# Patient Record
Sex: FEMALE | Race: WHITE | NOT HISPANIC OR LATINO | Employment: FULL TIME | URBAN - METROPOLITAN AREA
[De-identification: names, ages, dates, MRNs, and addresses within clinical notes are randomized per-mention and may not be internally consistent; named-entity substitution may affect disease eponyms.]

---

## 2020-04-21 DIAGNOSIS — Z01.84 ANTIBODY RESPONSE EXAMINATION: ICD-10-CM

## 2020-08-20 ENCOUNTER — TELEPHONE (OUTPATIENT)
Dept: EMERGENCY MEDICINE | Facility: HOSPITAL | Age: 50
End: 2020-08-20

## 2020-08-20 DIAGNOSIS — U07.1 COVID-19 VIRUS DETECTED: ICD-10-CM

## 2020-08-20 NOTE — TELEPHONE ENCOUNTER
Patient has been notified of COVID test result on behalf of [employer] and provided clinical guidance on such.

## 2021-01-16 ENCOUNTER — IMMUNIZATION (OUTPATIENT)
Dept: PRIMARY CARE CLINIC | Facility: CLINIC | Age: 51
End: 2021-01-16
Payer: COMMERCIAL

## 2021-01-16 DIAGNOSIS — Z23 NEED FOR VACCINATION: Primary | ICD-10-CM

## 2021-01-16 PROCEDURE — 91300 COVID-19, MRNA, LNP-S, PF, 30 MCG/0.3 ML DOSE VACCINE: CPT | Mod: PBBFAC | Performed by: EMERGENCY MEDICINE

## 2021-02-06 ENCOUNTER — IMMUNIZATION (OUTPATIENT)
Dept: PRIMARY CARE CLINIC | Facility: CLINIC | Age: 51
End: 2021-02-06
Payer: COMMERCIAL

## 2021-02-06 DIAGNOSIS — Z23 NEED FOR VACCINATION: Primary | ICD-10-CM

## 2021-02-06 PROCEDURE — 0002A COVID-19, MRNA, LNP-S, PF, 30 MCG/0.3 ML DOSE VACCINE: CPT | Mod: PBBFAC | Performed by: FAMILY MEDICINE

## 2021-02-06 PROCEDURE — 91300 COVID-19, MRNA, LNP-S, PF, 30 MCG/0.3 ML DOSE VACCINE: CPT | Mod: PBBFAC | Performed by: FAMILY MEDICINE

## 2021-11-10 ENCOUNTER — TELEPHONE (OUTPATIENT)
Dept: ADMINISTRATIVE | Facility: OTHER | Age: 51
End: 2021-11-10
Payer: COMMERCIAL

## 2022-09-14 ENCOUNTER — OFFICE VISIT (OUTPATIENT)
Dept: PRIMARY CARE CLINIC | Facility: CLINIC | Age: 52
End: 2022-09-14
Payer: COMMERCIAL

## 2022-09-14 VITALS
WEIGHT: 141.63 LBS | RESPIRATION RATE: 16 BRPM | OXYGEN SATURATION: 99 % | DIASTOLIC BLOOD PRESSURE: 80 MMHG | SYSTOLIC BLOOD PRESSURE: 132 MMHG | BODY MASS INDEX: 26.74 KG/M2 | TEMPERATURE: 97 F | HEART RATE: 113 BPM | HEIGHT: 61 IN

## 2022-09-14 DIAGNOSIS — R00.2 PALPITATIONS: ICD-10-CM

## 2022-09-14 DIAGNOSIS — R73.01 ELEVATED FASTING BLOOD SUGAR: ICD-10-CM

## 2022-09-14 DIAGNOSIS — I49.3 PVC'S (PREMATURE VENTRICULAR CONTRACTIONS): ICD-10-CM

## 2022-09-14 DIAGNOSIS — Z76.89 ENCOUNTER TO ESTABLISH CARE: Primary | ICD-10-CM

## 2022-09-14 LAB
BILIRUB SERPL-MCNC: NORMAL MG/DL
BLOOD URINE, POC: NORMAL
CLARITY, POC UA: CLEAR
COLOR, POC UA: YELLOW
GLUCOSE UR QL STRIP: NORMAL
KETONES UR QL STRIP: NORMAL
LEUKOCYTE ESTERASE URINE, POC: NORMAL
NITRITE, POC UA: NORMAL
PH, POC UA: 7
PROTEIN, POC: NORMAL
SPECIFIC GRAVITY, POC UA: 1.01
UROBILINOGEN, POC UA: NORMAL

## 2022-09-14 PROCEDURE — 99999 PR PBB SHADOW E&M-EST. PATIENT-LVL III: ICD-10-PCS | Mod: PBBFAC,,, | Performed by: NURSE PRACTITIONER

## 2022-09-14 PROCEDURE — 1159F MED LIST DOCD IN RCRD: CPT | Mod: CPTII,S$GLB,, | Performed by: NURSE PRACTITIONER

## 2022-09-14 PROCEDURE — 81002 POCT URINE DIPSTICK WITHOUT MICROSCOPE: ICD-10-PCS | Mod: S$GLB,,, | Performed by: NURSE PRACTITIONER

## 2022-09-14 PROCEDURE — 3075F PR MOST RECENT SYSTOLIC BLOOD PRESS GE 130-139MM HG: ICD-10-PCS | Mod: CPTII,S$GLB,, | Performed by: NURSE PRACTITIONER

## 2022-09-14 PROCEDURE — 3079F PR MOST RECENT DIASTOLIC BLOOD PRESSURE 80-89 MM HG: ICD-10-PCS | Mod: CPTII,S$GLB,, | Performed by: NURSE PRACTITIONER

## 2022-09-14 PROCEDURE — 3008F PR BODY MASS INDEX (BMI) DOCUMENTED: ICD-10-PCS | Mod: CPTII,S$GLB,, | Performed by: NURSE PRACTITIONER

## 2022-09-14 PROCEDURE — 3008F BODY MASS INDEX DOCD: CPT | Mod: CPTII,S$GLB,, | Performed by: NURSE PRACTITIONER

## 2022-09-14 PROCEDURE — 81002 URINALYSIS NONAUTO W/O SCOPE: CPT | Mod: S$GLB,,, | Performed by: NURSE PRACTITIONER

## 2022-09-14 PROCEDURE — 99203 OFFICE O/P NEW LOW 30 MIN: CPT | Mod: S$GLB,,, | Performed by: NURSE PRACTITIONER

## 2022-09-14 PROCEDURE — 1159F PR MEDICATION LIST DOCUMENTED IN MEDICAL RECORD: ICD-10-PCS | Mod: CPTII,S$GLB,, | Performed by: NURSE PRACTITIONER

## 2022-09-14 PROCEDURE — 3075F SYST BP GE 130 - 139MM HG: CPT | Mod: CPTII,S$GLB,, | Performed by: NURSE PRACTITIONER

## 2022-09-14 PROCEDURE — 3079F DIAST BP 80-89 MM HG: CPT | Mod: CPTII,S$GLB,, | Performed by: NURSE PRACTITIONER

## 2022-09-14 PROCEDURE — 99203 PR OFFICE/OUTPT VISIT, NEW, LEVL III, 30-44 MIN: ICD-10-PCS | Mod: S$GLB,,, | Performed by: NURSE PRACTITIONER

## 2022-09-14 PROCEDURE — 99999 PR PBB SHADOW E&M-EST. PATIENT-LVL III: CPT | Mod: PBBFAC,,, | Performed by: NURSE PRACTITIONER

## 2022-09-14 NOTE — PROGRESS NOTES
"Chief Complaint  Chief Complaint   Patient presents with    heart flutters     X2wks         HPI    Present with intermittent palpitations "flutter" for two weeks, notices more during the day while sitting. States does not notice them at night or with activity. Has stopped caffeine 1 week, but did not improve symptoms. Occasional flushing. Denies shortness of breath, chest, activity intolerance. Initially has some off-balance/dizzy feeling for 2 days, but that has stopped. Denies light-headedness, headaches.    Previous episode in 2019 with palpitations for 1 week, had holter, echo, and EKG- PVCs, treated with 2 weeks of Metoprolol. Magnesium slightly low at that time 1.7, was instructed to magnesium oxide, but did not take. Symptoms resolved.    Family Hx: Mother HTN, Father HTN. Denies major heart disease/heart attacks.    States because of family history of diabetes, randomly check blood sugar in July with family member's blood sugar monitor. Fasting morning sugar was 107-152. Started to decrease sugar intake. Checked morning blood sugars for 2 weeks and stopped. States check blood sugar 3 weeks ago, fasting morning sugar was 102. Denies polyuria, polyphagia.        PAST MEDICAL HISTORY:  No past medical history on file.    PAST SURGICAL HISTORY:  No past surgical history on file.    SOCIAL HISTORY:  Social History     Socioeconomic History    Marital status:    Tobacco Use    Smoking status: Never   Substance and Sexual Activity    Alcohol use: Not Currently    Drug use: Never       FAMILY HISTORY:  No family history on file.    ALLERGIES AND MEDICATIONS: updated and reviewed.  Review of patient's allergies indicates:  No Known Allergies  Current Outpatient Medications   Medication Sig Dispense Refill    multivit with minerals/lutein (MULTIVITAMIN 50 PLUS ORAL) Take by mouth.       No current facility-administered medications for this visit.         ROS  Review of Systems   Constitutional: Negative.  " "  HENT: Negative.     Eyes: Negative.    Respiratory: Negative.     Cardiovascular:  Positive for palpitations (x2 weeks, intermittent). Negative for chest pain and leg swelling.   Gastrointestinal: Negative.         Regular daily BM, brown, solid   Endocrine: Negative.    Genitourinary: Negative.    Musculoskeletal: Negative.    Skin: Negative.    Neurological:  Positive for dizziness (occasional off-balance. Dx: Vertigo). Negative for light-headedness and headaches.   Psychiatric/Behavioral: Negative.           PHYSICAL EXAM  Vitals:    09/14/22 1343   BP: 132/80   BP Location: Left arm   Patient Position: Sitting   BP Method: Medium (Manual)   Pulse: (!) 113   Resp: 16   Temp: 97.3 °F (36.3 °C)   TempSrc: Temporal   SpO2: 99%   Weight: 64.3 kg (141 lb 10.3 oz)   Height: 5' 1" (1.549 m)    Body mass index is 26.76 kg/m².  Weight: 64.3 kg (141 lb 10.3 oz)   Height: 5' 1" (154.9 cm)     Physical Exam  Vitals reviewed.   Constitutional:       Appearance: Normal appearance. She is normal weight.   HENT:      Head: Normocephalic.      Right Ear: Tympanic membrane, ear canal and external ear normal.      Left Ear: Tympanic membrane, ear canal and external ear normal.      Nose: Nose normal.      Mouth/Throat:      Mouth: Mucous membranes are moist.      Pharynx: Oropharynx is clear.   Eyes:      Conjunctiva/sclera: Conjunctivae normal.   Cardiovascular:      Rate and Rhythm: Regular rhythm. Tachycardia present.      Pulses: Normal pulses.      Heart sounds: Normal heart sounds.   Pulmonary:      Effort: Pulmonary effort is normal.      Breath sounds: Normal breath sounds.   Abdominal:      General: Bowel sounds are normal.      Palpations: Abdomen is soft.   Musculoskeletal:         General: Normal range of motion.      Cervical back: Normal range of motion.   Skin:     General: Skin is warm and dry.   Neurological:      Mental Status: She is alert and oriented to person, place, and time.   Psychiatric:         Mood and " Affect: Mood normal.         Behavior: Behavior normal.       Health Maintenance         Date Due Completion Date    Hepatitis C Screening Never done ---    Cervical Cancer Screening Never done ---    Lipid Panel Never done ---    HIV Screening Never done ---    Mammogram Never done ---    Colorectal Cancer Screening Never done ---    COVID-19 Vaccine (3 - Booster for Pfizer series) 07/06/2021 2/6/2021    Shingles Vaccine (1 of 2) Never done ---    Influenza Vaccine (1) 09/01/2022 10/27/2021    TETANUS VACCINE 04/02/2023 4/2/2013              Assessment & Plan    Problem List Items Addressed This Visit    None  Visit Diagnoses       Encounter to establish care    -  Primary    Relevant Orders    CBC Auto Differential (Completed)    Comprehensive Metabolic Panel (Completed)    Lipid Panel (Completed)    TSH (Completed)    Hemoglobin A1C    POCT URINE DIPSTICK WITHOUT MICROSCOPE (Completed)    Magnesium (Completed)    Palpitations        Relevant Orders    Holter monitor - 24 hour    Elevated fasting blood sugar        PVC's (premature ventricular contractions)              Problem List Items Addressed This Visit    None  Visit Diagnoses       Encounter to establish care    -  Primary    Relevant Orders    CBC Auto Differential    Comprehensive Metabolic Panel    Lipid Panel    TSH    Hemoglobin A1C    POCT URINE DIPSTICK WITHOUT MICROSCOPE (Completed)    Magnesium  Obtain labs for annual wellness, evaluate elevated blood sugar, and evaluate for any abnormalities that may be related to palpitations.  Urine dipstick in office negative, no glucose.    Will discuss remainder of wellness prevention at scheduled appt with Dr. Ospina in October. Stated that she will also be establishing with a OB/GYN for mammo and PAP testing.     Palpitations        Relevant Orders    Holter monitor - 24 hour  Will evaluate palpitations. If palpitations continue, will consider trial of metoprolol and cardiology referral.    Elevated  fasting blood sugar      Will evaluate elevated home fasting blood sugars with labs. Will evaluate for treatment based on lab results. Discussed continuing healthy eating choices lower in carbohydrates and sugar.      PVC's (premature ventricular contractions)      History in 2019. Will evaluate for PVCs on 24hr holter monitor.             Follow-up: Follow up in about 2 weeks (around 9/28/2022) for follow up with Dr. Ospina.    Citlaly Acharya    Medication List with Changes/Refills   Current Medications    MULTIVIT WITH MINERALS/LUTEIN (MULTIVITAMIN 50 PLUS ORAL)    Take by mouth.   Discontinued Medications    CETIRIZINE (ZYRTEC) 10 MG TABLET    Take 1 tablet (10 mg total) by mouth once daily.    ETODOLAC (LODINE) 200 MG CAP    Take 1 capsule (200 mg total) by mouth 3 (three) times daily.    FLUTICASONE PROPIONATE (FLONASE) 50 MCG/ACTUATION NASAL SPRAY    1 spray (50 mcg total) by Each Nostril route 2 (two) times daily as needed.    MELOXICAM (MOBIC) 15 MG TABLET    Take 1 tablet (15 mg total) by mouth once daily. Take with Pepcid    ONDANSETRON (ZOFRAN-ODT) 4 MG TBDL    Take 1 tablet (4 mg total) by mouth every 8 (eight) hours as needed.

## 2022-09-19 ENCOUNTER — PATIENT MESSAGE (OUTPATIENT)
Dept: PRIMARY CARE CLINIC | Facility: CLINIC | Age: 52
End: 2022-09-19
Payer: COMMERCIAL

## 2022-09-20 ENCOUNTER — PATIENT MESSAGE (OUTPATIENT)
Dept: PRIMARY CARE CLINIC | Facility: CLINIC | Age: 52
End: 2022-09-20
Payer: COMMERCIAL

## 2022-09-27 ENCOUNTER — PATIENT MESSAGE (OUTPATIENT)
Dept: PRIMARY CARE CLINIC | Facility: CLINIC | Age: 52
End: 2022-09-27
Payer: COMMERCIAL

## 2022-10-10 ENCOUNTER — PATIENT MESSAGE (OUTPATIENT)
Dept: PRIMARY CARE CLINIC | Facility: CLINIC | Age: 52
End: 2022-10-10

## 2022-10-10 ENCOUNTER — OFFICE VISIT (OUTPATIENT)
Dept: PRIMARY CARE CLINIC | Facility: CLINIC | Age: 52
End: 2022-10-10
Payer: COMMERCIAL

## 2022-10-10 ENCOUNTER — PATIENT MESSAGE (OUTPATIENT)
Dept: ADMINISTRATIVE | Facility: HOSPITAL | Age: 52
End: 2022-10-10
Payer: COMMERCIAL

## 2022-10-10 VITALS
DIASTOLIC BLOOD PRESSURE: 70 MMHG | BODY MASS INDEX: 26.54 KG/M2 | RESPIRATION RATE: 18 BRPM | HEIGHT: 61 IN | OXYGEN SATURATION: 97 % | SYSTOLIC BLOOD PRESSURE: 118 MMHG | WEIGHT: 140.56 LBS | TEMPERATURE: 98 F | HEART RATE: 95 BPM

## 2022-10-10 DIAGNOSIS — Z11.4 SCREENING FOR HIV (HUMAN IMMUNODEFICIENCY VIRUS): ICD-10-CM

## 2022-10-10 DIAGNOSIS — Z11.59 NEED FOR HEPATITIS C SCREENING TEST: ICD-10-CM

## 2022-10-10 DIAGNOSIS — Z12.31 ENCOUNTER FOR SCREENING MAMMOGRAM FOR BREAST CANCER: ICD-10-CM

## 2022-10-10 DIAGNOSIS — Z12.11 COLON CANCER SCREENING: ICD-10-CM

## 2022-10-10 DIAGNOSIS — Z23 NEED FOR VACCINATION: ICD-10-CM

## 2022-10-10 DIAGNOSIS — E78.2 MIXED HYPERLIPIDEMIA: ICD-10-CM

## 2022-10-10 DIAGNOSIS — I49.3 FREQUENT PVCS: ICD-10-CM

## 2022-10-10 DIAGNOSIS — Z00.00 ANNUAL PHYSICAL EXAM: Primary | ICD-10-CM

## 2022-10-10 PROCEDURE — 3078F PR MOST RECENT DIASTOLIC BLOOD PRESSURE < 80 MM HG: ICD-10-PCS | Mod: CPTII,S$GLB,, | Performed by: FAMILY MEDICINE

## 2022-10-10 PROCEDURE — 1159F PR MEDICATION LIST DOCUMENTED IN MEDICAL RECORD: ICD-10-PCS | Mod: CPTII,S$GLB,, | Performed by: FAMILY MEDICINE

## 2022-10-10 PROCEDURE — 99999 PR PBB SHADOW E&M-EST. PATIENT-LVL IV: CPT | Mod: PBBFAC,,, | Performed by: FAMILY MEDICINE

## 2022-10-10 PROCEDURE — 1160F PR REVIEW ALL MEDS BY PRESCRIBER/CLIN PHARMACIST DOCUMENTED: ICD-10-PCS | Mod: CPTII,S$GLB,, | Performed by: FAMILY MEDICINE

## 2022-10-10 PROCEDURE — 1159F MED LIST DOCD IN RCRD: CPT | Mod: CPTII,S$GLB,, | Performed by: FAMILY MEDICINE

## 2022-10-10 PROCEDURE — 3074F SYST BP LT 130 MM HG: CPT | Mod: CPTII,S$GLB,, | Performed by: FAMILY MEDICINE

## 2022-10-10 PROCEDURE — 99396 PREV VISIT EST AGE 40-64: CPT | Mod: S$GLB,,, | Performed by: FAMILY MEDICINE

## 2022-10-10 PROCEDURE — 3044F HG A1C LEVEL LT 7.0%: CPT | Mod: CPTII,S$GLB,, | Performed by: FAMILY MEDICINE

## 2022-10-10 PROCEDURE — 3008F BODY MASS INDEX DOCD: CPT | Mod: CPTII,S$GLB,, | Performed by: FAMILY MEDICINE

## 2022-10-10 PROCEDURE — 3044F PR MOST RECENT HEMOGLOBIN A1C LEVEL <7.0%: ICD-10-PCS | Mod: CPTII,S$GLB,, | Performed by: FAMILY MEDICINE

## 2022-10-10 PROCEDURE — 99396 PR PREVENTIVE VISIT,EST,40-64: ICD-10-PCS | Mod: S$GLB,,, | Performed by: FAMILY MEDICINE

## 2022-10-10 PROCEDURE — 1160F RVW MEDS BY RX/DR IN RCRD: CPT | Mod: CPTII,S$GLB,, | Performed by: FAMILY MEDICINE

## 2022-10-10 PROCEDURE — 3078F DIAST BP <80 MM HG: CPT | Mod: CPTII,S$GLB,, | Performed by: FAMILY MEDICINE

## 2022-10-10 PROCEDURE — 99999 PR PBB SHADOW E&M-EST. PATIENT-LVL IV: ICD-10-PCS | Mod: PBBFAC,,, | Performed by: FAMILY MEDICINE

## 2022-10-10 PROCEDURE — 3074F PR MOST RECENT SYSTOLIC BLOOD PRESSURE < 130 MM HG: ICD-10-PCS | Mod: CPTII,S$GLB,, | Performed by: FAMILY MEDICINE

## 2022-10-10 PROCEDURE — 3008F PR BODY MASS INDEX (BMI) DOCUMENTED: ICD-10-PCS | Mod: CPTII,S$GLB,, | Performed by: FAMILY MEDICINE

## 2022-10-10 RX ORDER — LANOLIN ALCOHOL/MO/W.PET/CERES
400 CREAM (GRAM) TOPICAL NIGHTLY
Refills: 0 | COMMUNITY
Start: 2022-10-10

## 2022-10-10 RX ORDER — ZOSTER VACCINE RECOMBINANT, ADJUVANTED 50 MCG/0.5
0.5 KIT INTRAMUSCULAR ONCE
Qty: 1 EACH | Refills: 1 | Status: SHIPPED | OUTPATIENT
Start: 2022-10-10 | End: 2022-10-10

## 2022-10-10 RX ORDER — AMOXICILLIN 500 MG
1 CAPSULE ORAL 2 TIMES DAILY
COMMUNITY

## 2022-10-10 NOTE — PROGRESS NOTES
Pt states last mammogram done in 2020 and is having her current OBGYN order her overdue mammogram. Pt request to send in her records from the previous mammogram.

## 2022-10-10 NOTE — PROGRESS NOTES
"Subjective:       Patient ID: Lupe Gasca is a 52 y.o. female.    Chief Complaint: Follow-up (Pt states in for a follow-up)    Here for annual, results from recent workup.  Holter monitor showed frequent PVCs.  Patient still having frequent palpitations.  No dizziness, lightheadedness, chest pain or shortness of breath.  Seems to be very sensitive to caffeine intake, a little better when she cut down on caffeine.  Cholesterol and triglycerides recent screening since started to cut sugar out of her diet.  Walking for exercise.  No family history of heart disease.  Echo done several years ago was normal.  Took beta-blockers for short period of time, palpitations    Review of Systems   Constitutional:  Negative for chills, fatigue and fever.   HENT:  Negative for congestion.    Eyes:  Negative for visual disturbance.   Respiratory:  Negative for cough, chest tightness and shortness of breath.    Cardiovascular:  Positive for palpitations. Negative for chest pain and leg swelling.   Gastrointestinal:  Negative for abdominal pain, nausea and vomiting.   Genitourinary:  Negative for difficulty urinating.   Musculoskeletal:  Negative for arthralgias.   Skin:  Negative for rash.   Allergic/Immunologic: Negative for immunocompromised state.   Neurological:  Negative for dizziness.   Psychiatric/Behavioral:  Negative for sleep disturbance.      Objective:      Vitals:    10/10/22 1046   BP: 118/70   BP Location: Right arm   Patient Position: Sitting   BP Method: Medium (Manual)   Pulse: 95   Resp: 18   Temp: 98.2 °F (36.8 °C)   TempSrc: Temporal   SpO2: 97%   Weight: 63.7 kg (140 lb 8.7 oz)   Height: 5' 1" (1.549 m)     Physical Exam  Vitals and nursing note reviewed.   Constitutional:       General: She is not in acute distress.     Appearance: Normal appearance. She is well-developed.   HENT:      Head: Normocephalic and atraumatic.   Neck:      Vascular: No carotid bruit.   Cardiovascular:      Rate and Rhythm: " Normal rate and regular rhythm.      Heart sounds: Normal heart sounds.   Pulmonary:      Effort: Pulmonary effort is normal.      Breath sounds: Normal breath sounds.   Abdominal:      General: There is no distension.      Tenderness: There is no abdominal tenderness.   Musculoskeletal:      Right lower leg: No edema.      Left lower leg: No edema.   Skin:     General: Skin is warm and dry.   Neurological:      Mental Status: She is alert and oriented to person, place, and time.   Psychiatric:         Mood and Affect: Mood normal.         Behavior: Behavior normal.       Lab Results   Component Value Date    WBC 7.42 09/14/2022    HGB 13.5 09/14/2022    HCT 39.7 09/14/2022     09/14/2022    CHOL 238 (H) 09/15/2022    TRIG 364 (H) 09/15/2022    HDL 43 09/15/2022    ALT 23 09/14/2022    AST 19 09/14/2022     09/14/2022    K 3.8 09/14/2022     09/14/2022    CREATININE 0.7 09/14/2022    BUN 13 09/14/2022    CO2 25 09/14/2022    TSH 1.021 09/14/2022    HGBA1C 5.3 09/14/2022      Assessment:       1. Annual physical exam    2. Frequent PVCs    3. Mixed hyperlipidemia    4. Need for hepatitis C screening test    5. Screening for HIV (human immunodeficiency virus)    6. Colon cancer screening    7. Encounter for screening mammogram for breast cancer    8. Need for vaccination          Plan:       Annual physical exam  Has upcoming appointment with gyn for Pap smear  Frequent PVCs  -     magnesium oxide (MAG-OX) 400 mg (241.3 mg magnesium) tablet; Take 1 tablet (400 mg total) by mouth every evening.; Refill: 0  Trial of magnesium supplementation.  Will message for an update in a few weeks.  If no improvement, consider restarting low-dose beta-blocker  Mixed hyperlipidemia  -     Lipid Panel; Future; Expected date: 04/10/2023  Low carb diet, exercise, recheck in 6-12 months  Need for hepatitis C screening test  -     Hepatitis C Antibody; Future; Expected date: 04/10/2023    Screening for HIV (human  immunodeficiency virus)  -     HIV 1/2 Ag/Ab (4th Gen); Future; Expected date: 04/10/2023    Colon cancer screening  -     Cologuard Screening (Multitarget Stool DNA); Future; Expected date: 10/10/2022    Encounter for screening mammogram for breast cancer  -     Mammo Digital Screening Bilat w/ Flakito; Future; Expected date: 10/10/2022    Need for vaccination  -     varicella-zoster gE-AS01B, PF, (SHINGRIX, PF,) 50 mcg/0.5 mL injection; Inject 0.5 mLs into the muscle once. for 1 dose  Dispense: 1 each; Refill: 1    Medication List with Changes/Refills   New Medications    MAGNESIUM OXIDE (MAG-OX) 400 MG (241.3 MG MAGNESIUM) TABLET    Take 1 tablet (400 mg total) by mouth every evening.    VARICELLA-ZOSTER GE-AS01B, PF, (SHINGRIX, PF,) 50 MCG/0.5 ML INJECTION    Inject 0.5 mLs into the muscle once. for 1 dose   Current Medications    MULTIVIT WITH MINERALS/LUTEIN (MULTIVITAMIN 50 PLUS ORAL)    Take by mouth.    OMEGA-3 FATTY ACIDS/FISH OIL (FISH OIL-OMEGA-3 FATTY ACIDS) 300-1,000 MG CAPSULE    Take 1 capsule by mouth 2 (two) times daily. TAKING BID

## 2022-10-31 ENCOUNTER — PATIENT MESSAGE (OUTPATIENT)
Dept: PRIMARY CARE CLINIC | Facility: CLINIC | Age: 52
End: 2022-10-31
Payer: COMMERCIAL

## 2022-12-07 ENCOUNTER — OFFICE VISIT (OUTPATIENT)
Dept: URGENT CARE | Facility: CLINIC | Age: 52
End: 2022-12-07
Payer: COMMERCIAL

## 2022-12-07 VITALS
DIASTOLIC BLOOD PRESSURE: 84 MMHG | TEMPERATURE: 99 F | WEIGHT: 142 LBS | SYSTOLIC BLOOD PRESSURE: 147 MMHG | RESPIRATION RATE: 18 BRPM | HEIGHT: 62 IN | HEART RATE: 87 BPM | BODY MASS INDEX: 26.13 KG/M2 | OXYGEN SATURATION: 98 %

## 2022-12-07 DIAGNOSIS — M25.511 ACUTE PAIN OF RIGHT SHOULDER: Primary | ICD-10-CM

## 2022-12-07 LAB
CTP QC/QA: YES
POC 10 PANEL DRUG SCREEN: NEGATIVE

## 2022-12-07 PROCEDURE — 80305 POCT RAPID DRUG SCREEN 10 PANEL: ICD-10-PCS | Mod: S$GLB,,, | Performed by: EMERGENCY MEDICINE

## 2022-12-07 PROCEDURE — 80305 DRUG TEST PRSMV DIR OPT OBS: CPT | Mod: S$GLB,,, | Performed by: EMERGENCY MEDICINE

## 2022-12-07 PROCEDURE — 99203 OFFICE O/P NEW LOW 30 MIN: CPT | Mod: S$GLB,,, | Performed by: EMERGENCY MEDICINE

## 2022-12-07 PROCEDURE — 99203 PR OFFICE/OUTPT VISIT, NEW, LEVL III, 30-44 MIN: ICD-10-PCS | Mod: S$GLB,,, | Performed by: EMERGENCY MEDICINE

## 2022-12-07 RX ORDER — METHYLPREDNISOLONE 4 MG/1
TABLET ORAL
Qty: 21 EACH | Refills: 0 | Status: SHIPPED | OUTPATIENT
Start: 2022-12-07 | End: 2022-12-28

## 2022-12-07 RX ORDER — METHYLPREDNISOLONE 4 MG/1
TABLET ORAL
Qty: 21 EACH | Refills: 0 | Status: SHIPPED | OUTPATIENT
Start: 2022-12-07 | End: 2022-12-07

## 2022-12-07 NOTE — PROGRESS NOTES
Subjective:       Patient ID: Lupe Gasca is a 52 y.o. female.    Chief Complaint: Shoulder Injury (right)    New workers comp injury DOI 10/27/2022 Patient was given her flu shot in the right deltoid when he pain began. RHD Pain 3-4/10. Mcj    PATIENT IS A  AT [employer] WHO ON OCTOBER 27TH RECEIVED HER FLU VACCINE AT A HEALTH [facility].  SHE STATES THAT SHE FEELS IT WAS GIVEN SUPERIORLY NOT IN THE NORMAL LOCATION AND THAT SHE HAD EXPECTED PAIN FOR 1-2 DAYS.  HOWEVER SHE DID HAVE PERSISTENT PAIN WITH RANGE OF MOTION SPECIFICALLY CROSSED ADDUCTION AND ABDUCTION OVER 90° AND THIS HAS NOT GONE AWAY AND HAS PERSISTED FOR 5-6 WEEKS.  SHE RECEIVED HER FLU SHOT AT [facility] DURING A FLU FAIR WHERE EMPLOYEE HEALTH CAME OUT TO THE SITE.  DISTALLY NEUROVASCULARLY INTACT WILL PROVIDE MEDROL DOSEPAK AND ORDER PHYSICAL THERAPY AND HAVE HER RETURN IN 1 WEEK TO SEE RESPONSE TO MEDROL DOSEPAK AND STATUS OF REFERRAL.    Shoulder Injury   The incident occurred at work. The right shoulder is affected. The incident occurred more than 1 week ago. The injury mechanism was a direct blow. The quality of the pain is described as aching and shooting. The pain does not radiate. The pain is at a severity of 4/10. The pain is moderate. Pertinent negatives include no numbness or tingling. The symptoms are aggravated by movement, overhead lifting and palpation. She has tried ice and NSAIDs for the symptoms. The treatment provided mild relief.     ROS    Musculoskeletal:  Positive for pain, joint pain, joint swelling and abnormal ROM of joint. Negative for muscle cramps and muscle ache.   Skin:  Negative for color change, erythema and bruising.   Neurological:  Negative for numbness and tingling.      Objective:      Physical Exam  Vitals and nursing note reviewed.   Constitutional:       Appearance: She is well-developed.   HENT:      Head: Normocephalic and atraumatic. No abrasion, contusion or laceration.       Right Ear: External ear normal.      Left Ear: External ear normal.      Nose: Nose normal.   Eyes:      General: Lids are normal.      Conjunctiva/sclera: Conjunctivae normal.      Pupils: Pupils are equal, round, and reactive to light.   Neck:      Trachea: Trachea and phonation normal.   Cardiovascular:      Rate and Rhythm: Normal rate and regular rhythm.      Heart sounds: Normal heart sounds.   Pulmonary:      Effort: Pulmonary effort is normal. No respiratory distress.      Breath sounds: Normal breath sounds. No stridor.   Musculoskeletal:         General: Tenderness and signs of injury present. No swelling.      Cervical back: Full passive range of motion without pain and neck supple.      Comments: NO SWELLING OR BRUISING OR ERYTHEMA OR CELLULITIS HOWEVER LIMITED CROSS BODY ADDUCTION AND ABDUCTION PAST 90°.  POSSIBLY BURSITIS VERSUS CAPSULITIS.   Skin:     General: Skin is warm and dry.      Capillary Refill: Capillary refill takes less than 2 seconds.      Findings: No abrasion, bruising, burn, ecchymosis, erythema, laceration, lesion or rash.   Neurological:      Mental Status: She is alert and oriented to person, place, and time.   Psychiatric:         Speech: Speech normal.         Behavior: Behavior normal.         Thought Content: Thought content normal.         Judgment: Judgment normal.       Assessment:       1. Acute pain of right shoulder          Plan:         PATIENT IS A  AT [employer] WHO ON OCTOBER 27TH RECEIVED HER FLU VACCINE AT A HE[facility].  SHE STATES THAT SHE FEELS IT WAS GIVEN SUPERIORLY NOT IN THE NORMAL LOCATION AND THAT SHE HAD EXPECTED PAIN FOR 1-2 DAYS.  HOWEVER SHE DID HAVE PERSISTENT PAIN WITH RANGE OF MOTION SPECIFICALLY CROSSED ADDUCTION AND ABDUCTION OVER 90° AND THIS HAS NOT GONE AWAY AND HAS PERSISTED FOR 5-6 WEEKS.  SHE RECEIVED HER FLU SHOT AT [facility] DURING A FLU FAIR WHERE EMPLOYEE HEALTH CAME OUT TO THE SITE.  DIST[facility] NEUROVASCULARLY  INTACT WILL PROVIDE MEDROL DOSEPAK AND ORDER PHYSICAL THERAPY AND HAVE HER RETURN IN 1 WEEK TO SEE RESPONSE TO MEDROL DOSEPAK AND STATUS OF REFERRAL.    Medications Ordered This Encounter   Medications    methylPREDNISolone (MEDROL DOSEPACK) 4 mg tablet     Sig: use as directed     Dispense:  21 each     Refill:  0     Patient Instructions: Begin Physical Therapy, Attention not to aggravate affected area, Daily home exercises/warm soaks   Restrictions: Limited use of right hand and arm  Follow up in about 1 week (around 12/14/2022).

## 2022-12-07 NOTE — LETTER
[facility] - Occupational Health  [address] [address]  Phone: [phone]  Fax: [phone]  [employer]: [email or web address]    Pt Name: Lupe Gasca  Injury Date: 10/27/2022   Employee ID: [id number] Date of First Treatment: 12/07/2022   Company: [employer]      Appointment Time: Arrived: 10:43 AM    Provider: Pasha Young MD Time Out: 12:24 PM     Office Treatment:   1. Acute pain of right shoulder      Medications Ordered This Encounter   Medications    methylPREDNISolone (MEDROL DOSEPACK) 4 mg tablet      Patient Instructions: Begin Physical Therapy, Attention not to aggravate affected area, Daily home exercises/warm soaks      Restrictions: Limited use of right hand and arm     Return Appointment: 12/14/2022 at  3:45 PM FANTA

## 2022-12-08 ENCOUNTER — PATIENT MESSAGE (OUTPATIENT)
Dept: ADMINISTRATIVE | Facility: HOSPITAL | Age: 52
End: 2022-12-08
Payer: COMMERCIAL

## 2022-12-10 LAB — NONINV COLON CA DNA+OCC BLD SCRN STL QL: NEGATIVE

## 2022-12-20 ENCOUNTER — OFFICE VISIT (OUTPATIENT)
Dept: URGENT CARE | Facility: CLINIC | Age: 52
End: 2022-12-20
Payer: COMMERCIAL

## 2022-12-20 VITALS
HEART RATE: 106 BPM | RESPIRATION RATE: 18 BRPM | HEIGHT: 62 IN | OXYGEN SATURATION: 100 % | DIASTOLIC BLOOD PRESSURE: 91 MMHG | BODY MASS INDEX: 26.13 KG/M2 | TEMPERATURE: 98 F | SYSTOLIC BLOOD PRESSURE: 147 MMHG | WEIGHT: 142 LBS

## 2022-12-20 DIAGNOSIS — T50.Z95A SHOULDER INJURY RELATED TO VACCINE ADMINISTRATION (SIRVA): Primary | ICD-10-CM

## 2022-12-20 DIAGNOSIS — M25.511 ACUTE PAIN OF RIGHT SHOULDER: ICD-10-CM

## 2022-12-20 DIAGNOSIS — S49.80XA SHOULDER INJURY RELATED TO VACCINE ADMINISTRATION (SIRVA): Primary | ICD-10-CM

## 2022-12-20 PROCEDURE — 99214 PR OFFICE/OUTPT VISIT, EST, LEVL IV, 30-39 MIN: ICD-10-PCS | Mod: S$GLB,,, | Performed by: NURSE PRACTITIONER

## 2022-12-20 PROCEDURE — 99214 OFFICE O/P EST MOD 30 MIN: CPT | Mod: S$GLB,,, | Performed by: NURSE PRACTITIONER

## 2022-12-20 RX ORDER — IBUPROFEN 800 MG/1
800 TABLET ORAL 3 TIMES DAILY
Qty: 30 TABLET | Refills: 0 | Status: SHIPPED | OUTPATIENT
Start: 2022-12-20

## 2022-12-20 NOTE — PROGRESS NOTES
"Subjective:       Patient ID: Lupe Gasca is a 52 y.o. female.    Chief Complaint: Shoulder Injury    Follow up visit DOI 10/27/2022 Patient was given her flu shot in the right deltoid when he pain began.  She has no pain today but it still bother a little her first PT  is tomorrow.LM \  Patient states medrol dose pack has really helped but still with some limitation "going side to side"    Shoulder Injury   The incident occurred at work. The right shoulder is affected. The incident occurred more than 1 week ago. The injury mechanism was a direct blow. The quality of the pain is described as aching and shooting. The pain does not radiate. The pain is at a severity of 4/10. The pain is moderate. Pertinent negatives include no numbness or tingling. The symptoms are aggravated by movement, overhead lifting and palpation. She has tried ice and NSAIDs for the symptoms. The treatment provided mild relief.     Constitution: Negative.   HENT: Negative.     Cardiovascular: Negative.    Respiratory: Negative.     Gastrointestinal: Negative.    Endocrine: negative.   Genitourinary: Negative.  Negative for frequency and urgency.   Musculoskeletal:  Positive for pain, joint pain, joint swelling and abnormal ROM of joint. Negative for muscle cramps and muscle ache.   Skin: Negative.  Negative for color change, erythema and bruising.   Allergic/Immunologic: Negative.    Neurological: Negative.  Negative for numbness and tingling.   Hematologic/Lymphatic: Negative.    Psychiatric/Behavioral: Negative.        Objective:      Physical Exam  Vitals and nursing note reviewed.   Constitutional:       General: She is not in acute distress.     Appearance: She is well-developed. She is not diaphoretic.   HENT:      Head: Normocephalic and atraumatic.      Right Ear: Hearing and external ear normal.      Left Ear: Hearing and external ear normal.      Nose: Nose normal. No nasal deformity.   Eyes:      General: Lids are normal. " No scleral icterus.     Conjunctiva/sclera: Conjunctivae normal.   Neck:      Trachea: Trachea normal.   Cardiovascular:      Pulses: Normal pulses.   Pulmonary:      Effort: Pulmonary effort is normal. No respiratory distress.      Breath sounds: No stridor.   Musculoskeletal:      Right shoulder: Tenderness present. No swelling. Decreased range of motion (internal and external rotation).      Cervical back: Normal range of motion.   Skin:     General: Skin is warm and dry.      Capillary Refill: Capillary refill takes less than 2 seconds.      Findings: No erythema.   Neurological:      Mental Status: She is alert. She is not disoriented.      GCS: GCS eye subscore is 4. GCS verbal subscore is 5. GCS motor subscore is 6.      Sensory: No sensory deficit.   Psychiatric:         Attention and Perception: She is attentive.         Speech: Speech normal.         Behavior: Behavior normal.       Assessment:       1. Shoulder injury related to vaccine administration (SIRVA)    2. Acute pain of right shoulder        Plan:         Medications Ordered This Encounter   Medications    ibuprofen (ADVIL,MOTRIN) 800 MG tablet     Sig: Take 1 tablet (800 mg total) by mouth 3 (three) times daily. Take with meals.     Dispense:  30 tablet     Refill:  0     Patient Instructions: Attention not to aggravate affected area, Daily home exercises/warm soaks, Begin Physical Therapy   Restrictions: Regular Duty  Follow up in about 2 weeks (around 1/3/2023).

## 2022-12-20 NOTE — LETTER
[facility] - Occupational Health  [address] [address]  Phone: [phone]  Fax: [phone]  [employer]: [email or web address]    Pt Name: Lupe Gasca  Injury Date: 10/27/2022   Employee ID: [id number] Date of First Treatment: 12/20/2022   Company: [employer]      Appointment Time: 02:15 PM Arrived: 2:03 PM   Provider: CAROLINE Schneider Time Out:2:40 PM     Office Treatment:   1. Shoulder injury related to vaccine administration (SIRVA)    2. Acute pain of right shoulder      Medications Ordered This Encounter   Medications    ibuprofen (ADVIL,MOTRIN) 800 MG tablet      Patient Instructions: Attention not to aggravate affected area, Daily home exercises/warm soaks, Begin Physical Therapy    Restrictions: Regular Duty     Return Appointment: 1/3/2023 at 2:30 PM     MAHESH

## 2023-01-04 ENCOUNTER — OFFICE VISIT (OUTPATIENT)
Dept: URGENT CARE | Facility: CLINIC | Age: 53
End: 2023-01-04
Payer: COMMERCIAL

## 2023-01-04 VITALS
RESPIRATION RATE: 18 BRPM | HEART RATE: 76 BPM | OXYGEN SATURATION: 98 % | TEMPERATURE: 99 F | DIASTOLIC BLOOD PRESSURE: 78 MMHG | SYSTOLIC BLOOD PRESSURE: 121 MMHG

## 2023-01-04 DIAGNOSIS — S49.80XA SHOULDER INJURY RELATED TO VACCINE ADMINISTRATION (SIRVA): ICD-10-CM

## 2023-01-04 DIAGNOSIS — Z02.6 ENCOUNTER RELATED TO WORKER'S COMPENSATION CLAIM: Primary | ICD-10-CM

## 2023-01-04 DIAGNOSIS — T50.Z95A SHOULDER INJURY RELATED TO VACCINE ADMINISTRATION (SIRVA): ICD-10-CM

## 2023-01-04 PROCEDURE — 99214 PR OFFICE/OUTPT VISIT, EST, LEVL IV, 30-39 MIN: ICD-10-PCS | Mod: S$GLB,,, | Performed by: STUDENT IN AN ORGANIZED HEALTH CARE EDUCATION/TRAINING PROGRAM

## 2023-01-04 PROCEDURE — 99214 OFFICE O/P EST MOD 30 MIN: CPT | Mod: S$GLB,,, | Performed by: STUDENT IN AN ORGANIZED HEALTH CARE EDUCATION/TRAINING PROGRAM

## 2023-01-04 NOTE — PROGRESS NOTES
Subjective:       Patient ID: Lupe Gasca is a 52 y.o. female.    Chief Complaint: Shoulder Injury (right)    Follow up visit DOI 10/27/2022 [facility] .RHD Patient is here to follow up on right shoulder injury. She is getting better. She is doing PT and HEP. She is taking her IBU as needed. Pain 2/10. Mcj    Patient states she had been getting better since starting PT. ADLs are improving. Infrequent/rare use of ibuprofen. Only notices pain when crossing her right arm across the midline. No new symptoms.     Shoulder Injury   The incident occurred at work. The right shoulder is affected. The incident occurred more than 1 week ago. The injury mechanism was a direct blow. The quality of the pain is described as aching and shooting. The pain does not radiate. The pain is at a severity of 4/10. The pain is moderate. Pertinent negatives include no numbness or tingling. The symptoms are aggravated by movement, overhead lifting and palpation. She has tried ice and NSAIDs for the symptoms. The treatment provided mild relief.     Musculoskeletal:  Positive for pain, joint pain, abnormal ROM of joint and muscle ache. Negative for joint swelling and muscle cramps.   Skin:  Negative for wound, abrasion, laceration, erythema and bruising.   Neurological:  Negative for numbness and tingling.      Objective:      Physical Exam  Vitals and nursing note reviewed.   Constitutional:       General: She is not in acute distress.     Appearance: She is not ill-appearing.   Musculoskeletal:      Right shoulder: No swelling, deformity, tenderness, bony tenderness or crepitus. Normal range of motion. Normal strength. Normal pulse.      Left shoulder: Normal range of motion.      Comments: Right anterior shoulder pain elicited with eversion against resistance and abduction. No pain with passive ROM but present with active ROM   Skin:     General: Skin is warm and dry.      Findings: No erythema.   Neurological:      Mental  Status: She is alert.       Assessment:       1. Encounter related to worker's compensation claim    2. Shoulder injury related to vaccine administration (SIRVA)          Plan:       Patient is progressing and improving with physical therapy.  No changes to full duty status at work. Ok to take ibuprofen if/when needed. Anticipate discharge at follow up visit if trend of improvement continues and PT completed.       Patient Instructions: Attention not to aggravate affected area, Daily home exercises/warm soaks, Continue Physical Therapy   Restrictions: Regular Duty  Follow up in about 3 weeks (around 1/25/2023).

## 2023-01-04 NOTE — LETTER
[facility]  [address] [address]  Phone: [phone]  Fax: [phone]  [employer] [employer] [facility]: [id number]    Pt Name: Lupe Gasca  Injury Date: 10/27/2022   Employee ID: [id number] Date of Treatment: 01/04/2023   Company: [employer]      Appointment Time: 03:30 PM Arrived: 3:11 PM    Provider: Moon Sanchez MD Time Out: 3:59 PM     Office Treatment:   1. Encounter related to worker's compensation claim    2. Shoulder injury related to vaccine administration (SIRVA)          Patient Instructions: Attention not to aggravate affected area, Daily home exercises/warm soaks, Continue Physical Therapy      Restrictions: Regular Duty     Return Appointment: 1/25/2023 at 3:30 PM FANTA

## 2023-01-25 ENCOUNTER — OFFICE VISIT (OUTPATIENT)
Dept: URGENT CARE | Facility: CLINIC | Age: 53
End: 2023-01-25
Payer: COMMERCIAL

## 2023-01-25 VITALS
HEIGHT: 62 IN | OXYGEN SATURATION: 98 % | DIASTOLIC BLOOD PRESSURE: 73 MMHG | SYSTOLIC BLOOD PRESSURE: 120 MMHG | TEMPERATURE: 98 F | BODY MASS INDEX: 26.13 KG/M2 | WEIGHT: 142 LBS | HEART RATE: 96 BPM

## 2023-01-25 DIAGNOSIS — T50.Z95A SHOULDER INJURY RELATED TO VACCINE ADMINISTRATION (SIRVA): Primary | ICD-10-CM

## 2023-01-25 DIAGNOSIS — S49.80XA SHOULDER INJURY RELATED TO VACCINE ADMINISTRATION (SIRVA): Primary | ICD-10-CM

## 2023-01-25 DIAGNOSIS — Z02.6 ENCOUNTER RELATED TO WORKER'S COMPENSATION CLAIM: ICD-10-CM

## 2023-01-25 PROCEDURE — 99213 OFFICE O/P EST LOW 20 MIN: CPT | Mod: S$GLB,,, | Performed by: NURSE PRACTITIONER

## 2023-01-25 PROCEDURE — 99213 PR OFFICE/OUTPT VISIT, EST, LEVL III, 20-29 MIN: ICD-10-PCS | Mod: S$GLB,,, | Performed by: NURSE PRACTITIONER

## 2023-01-25 NOTE — PROGRESS NOTES
Subjective:       Patient ID: Lupe Gasca is a 53 y.o. female.    Chief Complaint: Shoulder Injury    WC, RV (DOI 10/27/2022)  Patient works at [employer] She is here for follow up visit to the right shoulder.  Patient reports the R shoulder feels fine. There is a dull ache but it ruiz not interrupt her daily routine. She has no problems with lifting, pushing or pulling. ROM is fine. No pain medications, she only uses the heat from hot shower to help with the pain. Current pain score 0/10. RHD LRC     Pt reports she is feeling a lot better. She has been doing her regular activity both at work and at home without problems. Says she doesn't need PT anymore. Is doing her HEP that PT gave her. Not taking any medication. MWT    Constitution: Negative.   HENT: Negative.     Neck: neck negative.   Cardiovascular: Negative.    Eyes: Negative.    Respiratory: Negative.     Endocrine: negative.   Genitourinary: Negative.    Musculoskeletal:  Negative for pain, joint pain, joint swelling, abnormal ROM of joint, gout and muscle ache.   Skin: Negative.  Negative for erythema and bruising.   Neurological:  Negative for numbness and tingling.      Objective:      Physical Exam  Constitutional:       General: She is not in acute distress.     Appearance: Normal appearance.   HENT:      Right Ear: External ear normal.      Left Ear: External ear normal.   Eyes:      Conjunctiva/sclera: Conjunctivae normal.   Cardiovascular:      Pulses: Normal pulses.   Pulmonary:      Effort: Pulmonary effort is normal.   Abdominal:      General: Abdomen is flat.   Musculoskeletal:         General: No swelling, tenderness or deformity.      Right shoulder: No swelling, deformity, tenderness or crepitus. Normal range of motion. Normal strength. Normal pulse.      Comments: FROM to R shoulder without pain.5/5 strength. Neg empty can test.    Skin:     Findings: No bruising or erythema.   Neurological:      General: No focal deficit  present.      Mental Status: She is alert and oriented to person, place, and time.   Psychiatric:         Mood and Affect: Mood normal.         Behavior: Behavior normal.         Thought Content: Thought content normal.         Judgment: Judgment normal.       Assessment:       1. Shoulder injury related to vaccine administration (SIRVA)    2. Encounter related to worker's compensation claim          Plan:     Lupe is feeling much better and no longer is having any pain. She has been doing her regular activities without problems and will be discharged. If she has any remaining therapy sessions she may complete them if she desires but no more have been ordered.       Patient Instructions: Attention not to aggravate affected area, Daily home exercises/warm soaks, Discontinue Physical Therapy   Restrictions: Regular Duty, Discharged from Occupational Health  Follow up if symptoms worsen or fail to improve.

## 2023-01-25 NOTE — LETTER
[facility]  [address] [address]  Phone: [phone]  Fax: [phone]  [employer] [employer] [facility]: [id number]    Pt Name: Lupe Gasca  Injury Date: 10/27/2022   Employee ID: [id number] Date of Treatment: 01/25/2023   Company: [employer]      Appointment Time: 3:30pm Arrived: 3:14pm   Provider: Dayanna Marques NP Time Out: 3:55pm     Office Treatment:   1. Shoulder injury related to vaccine administration (SIRVA)    2. Encounter related to worker's compensation claim          Patient Instructions: Attention not to aggravate affected area, Daily home exercises/warm soaks, Discontinue Physical Therapy      Restrictions: Regular Duty, Discharged from Occupational Health     Return if symptoms worsen or fail to improve.    EC

## 2023-02-07 ENCOUNTER — HOSPITAL ENCOUNTER (OUTPATIENT)
Dept: RADIOLOGY | Facility: CLINIC | Age: 53
Discharge: HOME | End: 2023-02-07
Attending: FAMILY MEDICINE
Payer: COMMERCIAL

## 2023-02-07 DIAGNOSIS — Z12.31 ENCOUNTER FOR SCREENING MAMMOGRAM FOR BREAST CANCER: ICD-10-CM

## 2023-02-07 PROCEDURE — 77067 SCR MAMMO BI INCL CAD: CPT | Mod: 26,,, | Performed by: RADIOLOGY

## 2023-02-07 PROCEDURE — 77067 MAMMO DIGITAL SCREENING BILAT WITH TOMO: ICD-10-PCS | Mod: 26,,, | Performed by: RADIOLOGY

## 2023-02-07 PROCEDURE — 77063 BREAST TOMOSYNTHESIS BI: CPT | Mod: 26,,, | Performed by: RADIOLOGY

## 2023-02-07 PROCEDURE — 77067 SCR MAMMO BI INCL CAD: CPT | Mod: TC,PO

## 2023-02-07 PROCEDURE — 77063 MAMMO DIGITAL SCREENING BILAT WITH TOMO: ICD-10-PCS | Mod: 26,,, | Performed by: RADIOLOGY

## 2023-04-03 ENCOUNTER — PATIENT MESSAGE (OUTPATIENT)
Dept: ADMINISTRATIVE | Facility: HOSPITAL | Age: 53
End: 2023-04-03
Payer: COMMERCIAL

## 2023-08-09 ENCOUNTER — PATIENT OUTREACH (OUTPATIENT)
Dept: ADMINISTRATIVE | Facility: HOSPITAL | Age: 53
End: 2023-08-09
Payer: COMMERCIAL

## 2023-08-09 ENCOUNTER — PATIENT MESSAGE (OUTPATIENT)
Dept: ADMINISTRATIVE | Facility: HOSPITAL | Age: 53
End: 2023-08-09
Payer: COMMERCIAL

## 2023-08-09 NOTE — PROGRESS NOTES
Health Maintenance Due   Topic Date Due    Hepatitis C Screening  Never done    Cervical Cancer Screening  Never done    HIV Screening  Never done    Shingles Vaccine (1 of 2) Never done    TETANUS VACCINE  04/02/2023     Immunizations - reviewed and updated   Care Everywhere - triggered   Care Teams - updated   Outreach - Portal message sent to patient in regards to cervical cancer screening. No pap on file. HM updated.

## 2023-09-28 ENCOUNTER — PATIENT MESSAGE (OUTPATIENT)
Dept: PRIMARY CARE CLINIC | Facility: CLINIC | Age: 53
End: 2023-09-28
Payer: COMMERCIAL

## 2023-10-18 ENCOUNTER — PATIENT MESSAGE (OUTPATIENT)
Dept: CARDIOLOGY | Facility: CLINIC | Age: 53
End: 2023-10-18
Payer: COMMERCIAL

## 2023-10-28 ENCOUNTER — HOSPITAL ENCOUNTER (EMERGENCY)
Facility: HOSPITAL | Age: 53
Discharge: HOME | End: 2023-10-28
Attending: EMERGENCY MEDICINE
Payer: COMMERCIAL

## 2023-10-28 VITALS
HEIGHT: 62 IN | TEMPERATURE: 99 F | SYSTOLIC BLOOD PRESSURE: 150 MMHG | HEART RATE: 93 BPM | RESPIRATION RATE: 18 BRPM | WEIGHT: 140 LBS | OXYGEN SATURATION: 97 % | BODY MASS INDEX: 25.76 KG/M2 | DIASTOLIC BLOOD PRESSURE: 86 MMHG

## 2023-10-28 DIAGNOSIS — M79.669 CALF PAIN: ICD-10-CM

## 2023-10-28 PROCEDURE — 99284 EMERGENCY DEPT VISIT MOD MDM: CPT | Mod: 25

## 2023-10-29 NOTE — ED PROVIDER NOTES
Encounter Date: 10/28/2023       History     Chief Complaint   Patient presents with    Leg Pain     L leg pain x1 week worse today      53-year-old female presents to the ER with left calf pain that began a week ago.  No recent trauma.  No other complaints.  Pain worsens with ambulation.  Not on any hormone replacement therapy.  No chronic medical conditions.  No recent long car rides.    The history is provided by the patient.     Review of patient's allergies indicates:  No Known Allergies  Past Medical History:   Diagnosis Date    PVC's (premature ventricular contractions)      No past surgical history on file.  Family History   Problem Relation Age of Onset    Diabetes Mother     Diabetes Father     Diabetes Sister     Breast cancer Paternal Aunt     Diabetes Brother      Social History     Tobacco Use    Smoking status: Never    Smokeless tobacco: Never   Substance Use Topics    Alcohol use: Not Currently    Drug use: Never     Review of Systems   Respiratory: Negative.     Cardiovascular: Negative.  Negative for leg swelling.   Musculoskeletal:  Positive for myalgias.       Physical Exam     Initial Vitals [10/28/23 1930]   BP Pulse Resp Temp SpO2   (!) 150/86 (!) 114 18 98.5 °F (36.9 °C) 99 %      MAP       --         Physical Exam    Nursing note and vitals reviewed.  Constitutional: She appears well-developed and well-nourished.   HENT:   Head: Normocephalic and atraumatic.   Eyes: EOM are normal.   Neck: Neck supple.   Normal range of motion.  Cardiovascular:  Normal rate, regular rhythm and normal heart sounds.     Exam reveals no gallop and no friction rub.       No murmur heard.  Pulses:       Dorsalis pedis pulses are 2+ on the left side.   Pulmonary/Chest: Breath sounds normal. No respiratory distress. She has no wheezes. She has no rhonchi. She has no rales.   Musculoskeletal:         General: Normal range of motion.      Cervical back: Normal range of motion and neck supple.         Legs:      Neurological: She is alert and oriented to person, place, and time.   Skin: Skin is warm and dry.   Psychiatric: She has a normal mood and affect. Her behavior is normal. Judgment and thought content normal.         ED Course   Procedures  Labs Reviewed - No data to display       Imaging Results    None          Medications - No data to display  Medical Decision Making  53-year-old presents with calf pain for a week.  Ultrasound negative for DVT.  Advised recheck here or primary care if pain persists or worsens.  No chest pain no shortness of breath.  Likely muscle strain.    Amount and/or Complexity of Data Reviewed  Radiology:  Decision-making details documented in ED Course.               ED Course as of 10/28/23 2043   Sat Oct 28, 2023   1931 BP(!): 150/86 [EF]   1931 Temp: 98.5 °F (36.9 °C) [EF]   1931 Temp Source: Oral [EF]   1931 Pulse(!): 114 [EF]   1931 Resp: 18 [EF]   1931 SpO2: 99 % [EF]   2036 US Lower Extremity Veins Left [EF]      ED Course User Index  [EF] Norberto Hickey MD                    Clinical Impression:   Final diagnoses:  [M79.669] Calf pain               Norberto Hickey MD  10/28/23 2043

## 2024-01-02 ENCOUNTER — OFFICE VISIT (OUTPATIENT)
Dept: URGENT CARE | Facility: CLINIC | Age: 54
End: 2024-01-02
Payer: COMMERCIAL

## 2024-01-02 VITALS
HEART RATE: 115 BPM | WEIGHT: 146 LBS | BODY MASS INDEX: 26.87 KG/M2 | DIASTOLIC BLOOD PRESSURE: 77 MMHG | SYSTOLIC BLOOD PRESSURE: 160 MMHG | RESPIRATION RATE: 18 BRPM | TEMPERATURE: 99 F | HEIGHT: 62 IN | OXYGEN SATURATION: 98 %

## 2024-01-02 DIAGNOSIS — J02.9 SORE THROAT: ICD-10-CM

## 2024-01-02 DIAGNOSIS — R05.9 COUGH, UNSPECIFIED TYPE: ICD-10-CM

## 2024-01-02 DIAGNOSIS — J06.9 VIRAL URI WITH COUGH: Primary | ICD-10-CM

## 2024-01-02 LAB
CTP QC/QA: YES
FLUAV AG NPH QL: NEGATIVE
FLUBV AG NPH QL: NEGATIVE
MOLECULAR STREP A: NEGATIVE
SARS-COV-2 AG RESP QL IA.RAPID: NEGATIVE

## 2024-01-02 PROCEDURE — 87880 STREP A ASSAY W/OPTIC: CPT | Mod: QW,S$GLB,,

## 2024-01-02 PROCEDURE — 87811 SARS-COV-2 COVID19 W/OPTIC: CPT | Mod: QW,S$GLB,,

## 2024-01-02 PROCEDURE — 99214 OFFICE O/P EST MOD 30 MIN: CPT | Mod: S$GLB,,,

## 2024-01-02 PROCEDURE — 87804 INFLUENZA ASSAY W/OPTIC: CPT | Mod: QW,,,

## 2024-01-02 NOTE — PATIENT INSTRUCTIONS
Symptomatic treatment to include:     Rest, increase fluid intake to include electrolyte replacement  Ibuprofen/Tylenol as directed for fever, sore throat, body aches  Cough medication as prescribed    Warm, salt water gargles, over the counter throat lozenges or sprays as desires.   ER for difficulty breathing not relieved by rest, excessive lethargy and/or change in mental status

## 2024-01-02 NOTE — PROGRESS NOTES
"Subjective:      Patient ID: Lupe Gasca is a 53 y.o. female.    Vitals:  height is 5' 2" (1.575 m) and weight is 66.2 kg (146 lb). Her temperature is 98.6 °F (37 °C). Her blood pressure is 160/77 (abnormal) and her pulse is 115 (abnormal). Her respiration is 18 and oxygen saturation is 98%.     Chief Complaint: Cough and Sore Throat    Started Sunday night. Cough, sore throat, congestion, body aches.     Cough  This is a new problem. The current episode started today. The problem has been gradually worsening. The problem occurs constantly. The cough is Productive of brown sputum. Associated symptoms include chills, headaches, myalgias, nasal congestion, postnasal drip and a sore throat. Pertinent negatives include no fever. Associated symptoms comments: Eye redness. Nothing aggravates the symptoms. The treatment provided no relief.   Sore Throat   This is a new problem. The current episode started yesterday. The problem has been gradually worsening. The pain is at a severity of 5/10. The pain is mild. Associated symptoms include congestion, coughing, headaches, a plugged ear sensation and trouble swallowing. She has tried NSAIDs for the symptoms. The treatment provided no relief.       Constitution: Positive for chills and fatigue. Negative for fever.   HENT:  Positive for congestion, postnasal drip, sinus pressure, sore throat and trouble swallowing.    Neck: neck negative.   Cardiovascular: Negative.    Respiratory:  Positive for cough.    Gastrointestinal: Negative.    Musculoskeletal:  Positive for muscle ache.   Skin: Negative.    Neurological:  Positive for headaches.   Psychiatric/Behavioral: Negative.        Objective:     Physical Exam   Constitutional: She is oriented to person, place, and time. She appears well-developed. She is cooperative.  Non-toxic appearance. She does not appear ill. No distress.   HENT:   Head: Normocephalic and atraumatic.   Ears:   Right Ear: Hearing, tympanic membrane, " external ear and ear canal normal.   Left Ear: Hearing, tympanic membrane, external ear and ear canal normal.   Nose: Rhinorrhea and congestion present. No mucosal edema or nasal deformity. No epistaxis. Right sinus exhibits no maxillary sinus tenderness and no frontal sinus tenderness. Left sinus exhibits no maxillary sinus tenderness and no frontal sinus tenderness.   Mouth/Throat: Uvula is midline and mucous membranes are normal. Mucous membranes are moist. No trismus in the jaw. Normal dentition. No uvula swelling. Posterior oropharyngeal erythema present. No posterior oropharyngeal edema.   Eyes: Conjunctivae and lids are normal.   Neck: Trachea normal and phonation normal. Neck supple. No edema present. No erythema present. No neck rigidity present.   Cardiovascular: Normal rate, regular rhythm and normal heart sounds.   Pulmonary/Chest: Effort normal and breath sounds normal. No respiratory distress. She has no decreased breath sounds. She has no wheezes. She has no rhonchi.   Abdominal: Normal appearance.   Musculoskeletal: Normal range of motion.         General: No deformity. Normal range of motion.   Neurological: She is alert and oriented to person, place, and time. She displays no weakness. She exhibits normal muscle tone.   Skin: Skin is warm, dry, intact, not diaphoretic and not pale.   Psychiatric: Her speech is normal and behavior is normal. Mood, judgment and thought content normal.   Nursing note and vitals reviewed.      Assessment:     1. Viral URI with cough    2. Cough, unspecified type    3. Sore throat        Plan:       Viral URI with cough    Cough, unspecified type  -     SARS Coronavirus 2 Antigen, POCT Manual Read  -     POCT Influenza A/B Rapid Antigen  -     pyrilamine-phenylephrine-DM 12.5-5-7.5 mg/5 mL Liqd; Take 10 mLs by mouth every 6 (six) hours as needed (cough).  Dispense: 350 mL; Refill: 0    Sore throat  -     POCT Strep A, Molecular      COVID: neg  FLU: neg  Strep:  neg    Discussed medication with patient who acknowledges understanding and is agreeable to POC. Follow up with primary care. Increase fluid intake. Red flags for ER discussed.

## 2024-02-21 DIAGNOSIS — Z12.31 OTHER SCREENING MAMMOGRAM: ICD-10-CM

## 2024-02-26 ENCOUNTER — PATIENT MESSAGE (OUTPATIENT)
Dept: ADMINISTRATIVE | Facility: HOSPITAL | Age: 54
End: 2024-02-26
Payer: COMMERCIAL

## 2024-03-08 ENCOUNTER — PATIENT MESSAGE (OUTPATIENT)
Dept: ADMINISTRATIVE | Facility: HOSPITAL | Age: 54
End: 2024-03-08
Payer: COMMERCIAL

## 2024-03-08 ENCOUNTER — PATIENT OUTREACH (OUTPATIENT)
Dept: ADMINISTRATIVE | Facility: HOSPITAL | Age: 54
End: 2024-03-08
Payer: COMMERCIAL

## 2024-03-08 NOTE — PROGRESS NOTES
Health Maintenance Due   Topic Date Due    Hepatitis C Screening  Never done    Cervical Cancer Screening  Never done    HIV Screening  Never done    Shingles Vaccine (1 of 2) Never done    TETANUS VACCINE  2023    COVID-19 Vaccine (3 - 2023-24 season) 2023    Hemoglobin A1c (Prediabetes)  2023    Mammogram  2024     Population Health Chart Review & Patient Outreach Details        Further Action Needed If Patient Returns Outreach:       Chart review done. Cervical cancer screening due. Portal message sent in regards to scheduling with GYN     Updates Requested / Reviewed:                 [x]  Care Everywhere               []                [x]  External Sources ([facility], [facility], [facility], etc.)                          [x] [facility]   [x] [facility]   [] Other:               []  Care Team Updated              []  Removed  or Duplicate Orders              [x]  Immunization Reconciliation Completed / Queried                          [x] [place]   [] [place]   [] [place]   [] [place]        Health Maintenance Topics Addressed and Outreach Outcomes / Actions Taken:                Breast Cancer Screening []   Mammogram Order Placed    []   Mammogram Screening Scheduled    []   External Records Requested & Care Team Updated if Applicable    []   External Records Uploaded & Care Team Updated if Applicable    []   Pt Declined Scheduling Mammogram    []   Pt Will Schedule with External Provider / Order Routed & Care Team Updated if Applicable                  Cervical Cancer Screening []   Pap Smear Scheduled in Primary Care or OBGYN    []   External Records Requested & Care Team Updated if Applicable       []   External Records Uploaded, Care Team Updated, & History Updated if Applicable    []   Patient Declined Scheduling Pap Smear    []   Patient Will Schedule with External Provider & Care Team Updated if Applicable                      Colorectal Cancer Screening []   Colonoscopy Case  Request / Referral / Home Test Order Placed    []   External Records Requested & Care Team Updated if Applicable    []   External Records Uploaded, Care Team Updated, & History Updated if Applicable    []   Patient Declined Completing Colon Cancer Screening    []   Patient Will Schedule with External Provider & Care Team Updated if Applicable    []   Fit Kit Mailed (add the SmartPhrase under additional notes)    []   Reminded Patient to Complete Home Test                     Diabetic Eye Exam []   Eye Exam Screening Order Placed    []   Eye Camera Scheduled or Optometry/Ophthalmology Referral Placed    []   External Records Requested & Care Team Updated if Applicable    []   External Records Uploaded, Care Team Updated, & History Updated if Applicable    []   Patient Declined Scheduling Eye Exam    []   Patient Will Schedule with External Provider & Care Team Updated if Applicable                  Blood Pressure Control []   Primary Care Follow Up Visit Scheduled     []   Remote Blood Pressure Reading Captured    []   Patient Declined Remote Reading or Scheduling Appt - Escalated to PCP    []   Patient Will Call Back or Send Portal Message with Reading                     HbA1c & Other Labs []   Overdue Lab(s) Ordered    []   Overdue Lab(s) Scheduled    []   External Records Uploaded & Care Team Updated if Applicable    []   Primary Care Follow Up Visit Scheduled     []   Reminded Patient to Complete A1c Home Test    []   Patient Declined Scheduling Labs or Will Call Back to Schedule    []   Patient Will Schedule with External Provider / Order Routed, & Care Team Updated if Applicable               Primary Care Appointment []   Primary Care Appt Scheduled    []   Patient Declined Scheduling or Will Call Back to Schedule    []   Pt Established with External Provider, Updated Care Team, & Informed Pt to Notify Payor if Applicable               Medication Adherence /    Statin Use []   Primary Care Appointment  Scheduled    []   Patient Reminded to  Prescription    []   Patient Declined, Provider Notified if Needed    []   Sent Provider Message to Review to Evaluate Pt for Statin, Add Exclusion Dx Codes, Document   Exclusion in Problem List, Change Statin Intensity Level to Moderate or High Intensity if Applicable                     Osteoporosis Screening []   Dexa Order Placed    []   Dexa Appointment Scheduled    []   External Records Requested & Care Team Updated    []   External Records Uploaded, Care Team Updated, & History Updated if Applicable    []   Patient Declined Scheduling Dexa or Will Call Back to Schedule    []   Patient Will Schedule with External Provider / Order Routed & Care Team Updated if Applicable         Additional Notes:

## 2024-04-09 ENCOUNTER — PATIENT OUTREACH (OUTPATIENT)
Dept: ADMINISTRATIVE | Facility: HOSPITAL | Age: 54
End: 2024-04-09
Payer: COMMERCIAL

## 2024-04-09 NOTE — PROGRESS NOTES
Health Maintenance Due   Topic Date Due    Hepatitis C Screening  Never done    Cervical Cancer Screening  Never done    HIV Screening  Never done    Shingles Vaccine (1 of 2) Never done    TETANUS VACCINE  04/02/2023    COVID-19 Vaccine (3 - 2023-24 season) 09/01/2023    Hemoglobin A1c (Prediabetes)  09/14/2023    Mammogram  02/07/2024     Immunizations - reviewed and updated   Care Everywhere - triggered   Care Teams -   Outreach - Chart review done. Patient due for mammogram and pap smear. Called for patient in regards to scheduling, no answer. LM on   [facility] reviewed

## 2024-09-19 ENCOUNTER — PATIENT MESSAGE (OUTPATIENT)
Dept: PRIMARY CARE CLINIC | Facility: CLINIC | Age: 54
End: 2024-09-19
Payer: COMMERCIAL

## 2025-01-27 ENCOUNTER — PATIENT MESSAGE (OUTPATIENT)
Dept: PRIMARY CARE CLINIC | Facility: CLINIC | Age: 55
End: 2025-01-27
Payer: COMMERCIAL

## 2025-01-27 ENCOUNTER — HOSPITAL ENCOUNTER (OUTPATIENT)
Dept: RADIOLOGY | Facility: CLINIC | Age: 55
Discharge: HOME | End: 2025-01-27
Attending: FAMILY MEDICINE
Payer: COMMERCIAL

## 2025-01-27 DIAGNOSIS — Z12.31 OTHER SCREENING MAMMOGRAM: ICD-10-CM

## 2025-01-27 PROCEDURE — 77063 BREAST TOMOSYNTHESIS BI: CPT | Mod: 26,,, | Performed by: RADIOLOGY

## 2025-01-27 PROCEDURE — 77067 SCR MAMMO BI INCL CAD: CPT | Mod: 26,,, | Performed by: RADIOLOGY

## 2025-01-27 PROCEDURE — 77067 SCR MAMMO BI INCL CAD: CPT | Mod: TC,PO

## 2025-08-18 ENCOUNTER — TELEPHONE (OUTPATIENT)
Dept: DERMATOLOGY | Facility: CLINIC | Age: 55
End: 2025-08-18
Payer: COMMERCIAL

## 2025-08-19 ENCOUNTER — OFFICE VISIT (OUTPATIENT)
Dept: URGENT CARE | Facility: CLINIC | Age: 55
End: 2025-08-19
Payer: COMMERCIAL

## 2025-08-19 VITALS
SYSTOLIC BLOOD PRESSURE: 130 MMHG | OXYGEN SATURATION: 98 % | DIASTOLIC BLOOD PRESSURE: 82 MMHG | HEART RATE: 104 BPM | BODY MASS INDEX: 25.4 KG/M2 | RESPIRATION RATE: 18 BRPM | WEIGHT: 138 LBS | HEIGHT: 62 IN | TEMPERATURE: 98 F

## 2025-08-19 DIAGNOSIS — R21 RASH AND OTHER NONSPECIFIC SKIN ERUPTION: ICD-10-CM

## 2025-08-19 DIAGNOSIS — B02.8 HERPES ZOSTER WITH COMPLICATION: Primary | ICD-10-CM

## 2025-08-19 PROCEDURE — 99214 OFFICE O/P EST MOD 30 MIN: CPT | Mod: S$GLB,,,

## 2025-08-19 RX ORDER — VALACYCLOVIR HYDROCHLORIDE 1 G/1
1000 TABLET, FILM COATED ORAL 3 TIMES DAILY
Qty: 21 TABLET | Refills: 0 | Status: SHIPPED | OUTPATIENT
Start: 2025-08-19 | End: 2025-08-26